# Patient Record
Sex: FEMALE | URBAN - METROPOLITAN AREA
[De-identification: names, ages, dates, MRNs, and addresses within clinical notes are randomized per-mention and may not be internally consistent; named-entity substitution may affect disease eponyms.]

---

## 2020-10-23 ENCOUNTER — OFFICE VISIT (OUTPATIENT)
Dept: URBAN - METROPOLITAN AREA CLINIC 17 | Facility: CLINIC | Age: 75
End: 2020-10-23
Payer: COMMERCIAL

## 2020-10-23 DIAGNOSIS — H34.8312 TRIBUTARY (BRANCH) RETINAL VEIN OCCLUSION, RIGHT EYE, STABLE: ICD-10-CM

## 2020-10-23 DIAGNOSIS — H25.12 AGE-RELATED NUCLEAR CATARACT, LEFT EYE: ICD-10-CM

## 2020-10-23 DIAGNOSIS — H25.811 COMBINED FORMS OF AGE-RELATED CATARACT, RIGHT EYE: Primary | ICD-10-CM

## 2020-10-23 PROCEDURE — 92004 COMPRE OPH EXAM NEW PT 1/>: CPT | Performed by: OPHTHALMOLOGY

## 2020-10-23 ASSESSMENT — KERATOMETRY
OS: 44.50
OD: 45.50

## 2020-10-23 ASSESSMENT — INTRAOCULAR PRESSURE
OD: 17
OS: 19

## 2020-10-23 ASSESSMENT — VISUAL ACUITY: OS: 20/30

## 2020-10-23 NOTE — IMPRESSION/PLAN
Impression: Age-related nuclear cataract, left eye: H25.12. Condition: established, stable. Plan: Discussed diagnosis in detail with patient. No surgical treatment currently recommended. The patient will monitor vision changes and contact us with any decrease in vision.

## 2020-10-23 NOTE — IMPRESSION/PLAN
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 Plan: Discussed diagnosis in detail with patient. Advised patient of condition. Continue seeing retina specialist for treatment/monitoring.

## 2020-10-23 NOTE — IMPRESSION/PLAN
Impression: Combined forms of age-related cataract, right eye: H25.811. Condition: established, worsening. Symptoms: could improve with surgery. Plan: Cataract accounts for patient's complaints. Reviewed risks, benefits, and procedure. Patient desires surgery, schedule ce/iol OD, RL2, discussed lens options, distance refractive target, patient is clear for surgery in [facility].

## 2020-11-13 ENCOUNTER — PRE-OPERATIVE VISIT (OUTPATIENT)
Dept: URBAN - METROPOLITAN AREA CLINIC 17 | Facility: CLINIC | Age: 75
End: 2020-11-13
Payer: COMMERCIAL

## 2020-11-13 PROCEDURE — 92025 CPTRIZED CORNEAL TOPOGRAPHY: CPT | Performed by: OPHTHALMOLOGY

## 2020-11-13 RX ORDER — KETOROLAC TROMETHAMINE 5 MG/ML
0.5 % SOLUTION OPHTHALMIC
Qty: 1 | Refills: 1 | Status: ACTIVE
Start: 2020-11-13

## 2020-11-13 RX ORDER — OFLOXACIN 3 MG/ML
0.3 % SOLUTION/ DROPS OPHTHALMIC
Qty: 5 | Refills: 1 | Status: INACTIVE
Start: 2020-11-13 | End: 2021-01-06

## 2020-11-13 RX ORDER — PREDNISOLONE ACETATE 10 MG/ML
1 % SUSPENSION/ DROPS OPHTHALMIC
Qty: 10 | Refills: 1 | Status: INACTIVE
Start: 2020-11-13 | End: 2021-01-06

## 2020-11-13 ASSESSMENT — PACHYMETRY
OS: 2.71
OS: 23.37
OD: 2.67
OD: 23.36

## 2020-11-23 ENCOUNTER — SURGERY (OUTPATIENT)
Dept: URBAN - METROPOLITAN AREA SURGERY 7 | Facility: SURGERY | Age: 75
End: 2020-11-23
Payer: COMMERCIAL

## 2020-11-23 PROCEDURE — 66984 XCAPSL CTRC RMVL W/O ECP: CPT | Performed by: OPHTHALMOLOGY

## 2020-11-24 ENCOUNTER — POST-OPERATIVE VISIT (OUTPATIENT)
Dept: URBAN - METROPOLITAN AREA CLINIC 17 | Facility: CLINIC | Age: 75
End: 2020-11-24
Payer: COMMERCIAL

## 2020-11-24 DIAGNOSIS — Z48.810 ENCOUNTER FOR SURGICAL AFTERCARE FOLLOWING SURGERY ON A SENSE ORGAN: Primary | ICD-10-CM

## 2020-11-24 PROCEDURE — 99024 POSTOP FOLLOW-UP VISIT: CPT | Performed by: OPTOMETRIST

## 2020-11-24 RX ORDER — BRIMONIDINE TARTRATE, TIMOLOL MALEATE 2; 5 MG/ML; MG/ML
SOLUTION/ DROPS OPHTHALMIC
Qty: 0 | Refills: 0 | Status: ACTIVE
Start: 2020-11-24

## 2020-11-24 ASSESSMENT — INTRAOCULAR PRESSURE
OD: 32
OD: 27
OS: 18

## 2020-11-24 NOTE — IMPRESSION/PLAN
Impression: S/P CE/Standard IOL SA60WF +20.00 OD - 1 Day. Encounter for surgical aftercare following surgery on a sense organ  Z48.810. Post operative instructions reviewed - Discussed [facility] being limited due to BRVO- Elevated IOP, start Combigan BID OD (sample given) Plan: RTC in 1 week for PO2 OD:
--Continue Ofloxacin 0.3% QID x 1 week then d/c
--Taper Prednisolone acetate 1% QID x 5 wk, TID x 1 wk, BID x 1wk, QD x 1wk, then d/c
--Start Ketorolac QID x 8 weeks --Start Combigan BID OD x 1 week

## 2020-11-30 ENCOUNTER — POST-OPERATIVE VISIT (OUTPATIENT)
Dept: URBAN - METROPOLITAN AREA CLINIC 17 | Facility: CLINIC | Age: 75
End: 2020-11-30
Payer: COMMERCIAL

## 2020-11-30 PROCEDURE — 99024 POSTOP FOLLOW-UP VISIT: CPT | Performed by: OPTOMETRIST

## 2020-11-30 ASSESSMENT — INTRAOCULAR PRESSURE
OD: 5
OS: 9

## 2020-11-30 ASSESSMENT — VISUAL ACUITY: OD: CF 5FT

## 2020-11-30 NOTE — IMPRESSION/PLAN
Impression: S/P CE/Standard IOL SA60WF +20.00 OD - 7 Days. Encounter for surgical aftercare following surgery on a sense organ  Z48.810.  Post operative instructions reviewed - Plan: --Taper Prednisolone acetate 1% QID x 1 wk, TID x 1 wk, BID x 1wk, QD x 1wk, then d/c
--Discontinue Ofloxacin 0.3%

## 2021-01-06 ENCOUNTER — POST-OPERATIVE VISIT (OUTPATIENT)
Dept: URBAN - METROPOLITAN AREA CLINIC 17 | Facility: CLINIC | Age: 76
End: 2021-01-06
Payer: COMMERCIAL

## 2021-01-06 PROCEDURE — 99024 POSTOP FOLLOW-UP VISIT: CPT | Performed by: OPTOMETRIST

## 2021-01-06 ASSESSMENT — INTRAOCULAR PRESSURE
OS: 11
OD: 16

## 2021-01-06 ASSESSMENT — VISUAL ACUITY
OD: CF 5FT
OS: 20/20

## 2021-01-06 NOTE — IMPRESSION/PLAN
Impression: S/P CE/Standard IOL SA60WF +20.00 OD - 44 Days. Encounter for surgical aftercare following surgery on a sense organ  Z48.810. Post operative instructions reviewed - Plan: Patient will continue Retina care with MD in [place].  --Continue Ketorolac 0.5% QID OD until gone